# Patient Record
Sex: MALE | Race: WHITE | NOT HISPANIC OR LATINO | Employment: FULL TIME | URBAN - NONMETROPOLITAN AREA
[De-identification: names, ages, dates, MRNs, and addresses within clinical notes are randomized per-mention and may not be internally consistent; named-entity substitution may affect disease eponyms.]

---

## 2024-05-31 ENCOUNTER — OFFICE VISIT (OUTPATIENT)
Dept: URGENT CARE | Facility: CLINIC | Age: 24
End: 2024-05-31
Payer: MEDICAID

## 2024-05-31 VITALS
DIASTOLIC BLOOD PRESSURE: 87 MMHG | SYSTOLIC BLOOD PRESSURE: 125 MMHG | BODY MASS INDEX: 27.59 KG/M2 | OXYGEN SATURATION: 100 % | HEIGHT: 74 IN | HEART RATE: 71 BPM | RESPIRATION RATE: 18 BRPM | WEIGHT: 215 LBS | TEMPERATURE: 97.7 F

## 2024-05-31 DIAGNOSIS — J30.9 ALLERGIC RHINITIS, UNSPECIFIED SEASONALITY, UNSPECIFIED TRIGGER: Primary | ICD-10-CM

## 2024-05-31 PROCEDURE — 99213 OFFICE O/P EST LOW 20 MIN: CPT | Performed by: NURSE PRACTITIONER

## 2024-05-31 RX ORDER — CITALOPRAM 20 MG/1
20 TABLET, FILM COATED ORAL DAILY
COMMUNITY

## 2024-05-31 RX ORDER — PREDNISONE 10 MG/1
TABLET ORAL
Qty: 21 TABLET | Refills: 0 | Status: SHIPPED | OUTPATIENT
Start: 2024-05-31

## 2024-05-31 RX ORDER — BUPROPION HYDROCHLORIDE 100 MG/1
TABLET, EXTENDED RELEASE ORAL 2 TIMES DAILY
COMMUNITY

## 2024-05-31 RX ORDER — BENZONATATE 100 MG/1
100-200 CAPSULE ORAL EVERY 8 HOURS PRN
Qty: 60 CAPSULE | Refills: 0 | Status: SHIPPED | OUTPATIENT
Start: 2024-05-31

## 2024-05-31 NOTE — LETTER
May 31, 2024     Patient: Navarro Browning   YOB: 2000   Date of Visit: 5/31/2024       To Whom It May Concern:    Navarro Browning was seen at [facility] on 5/31/2024. Please excuse Navarro from work/school beginning now and through: 6/1/24.       Sincerely,         Sera Mcdonald, GRANT-CNP

## 2024-05-31 NOTE — PROGRESS NOTES
"[facility]  Sera Mcdonald, APRN-CNP     Visit Note - 5/31/2024 12:49 PM   This note was generated with voice recognition software and may contain errors including spelling, grammar, syntax, and misrecognization of what was dictated.    Patient: Navarro Browning, MRN: [id number], 23 y.o., male   PCP: No primary care provider on file.  ------------------------------------  ALLERGIES: No Known Allergies     CURRENT MEDICATIONS:   Current Outpatient Medications   Medication Instructions    benzonatate (TESSALON) 100-200 mg, oral, Every 8 hours PRN, Do not crush or chew.    buPROPion SR (Wellbutrin SR) 100 mg 12 hr tablet oral, 2 times daily, Do not crush, chew, or split.    citalopram (CELEXA) 20 mg, oral, Daily    predniSONE (Deltasone) 10 mg tablet Take 6 tabs PO daily x1 day, then take 5 tabs daily x1 day, then take 4 tabs daily x1 day, then take 3 tabs daily x1 day, then take 2 tabs daily x1 day, then take 1 tab daily x1 day. Take with a meal.     ------------------------------------  PAST MEDICAL HX:  History of depression and seasonal/environmental allergies.    SURGICAL HX:  History reviewed. No pertinent surgical history.   FAMILY HX:   No pertinent history.   SOCIAL HX:    has no history on file for tobacco use. Employed - works in construction.   ------------------------------------  CHIEF COMPLAINT:   Chief Complaint   Patient presents with    Cough     Cough, sinus congestion X 2 weeks       HISTORY OF PRESENT ILLNESS: The history was obtained from patient and his significant other. Navarro is a 23 y.o. male, who presents with a chief complaint of \"bad allergies\" and a cough x 2-3 weeks. Reports he has a history of bad seasonal allergies and was on allergy injections in the past, but not recently. Reports he does not feel ill. Has had nasal congestion, ears feel plugged, a persistent cough (sometimes with yellow phlegm), PND, and his throat feels a little scratchy. Had an episode of " "post-tussive vomiting last night- reports starts coughing and can't stop due to tickle in his throat. Denies any fever/chills, body aches, malaise, ear pain, abdominal pain, chest pain, wheezing/shortness of breath, rashes, urinary symptoms, and diarrhea. Denies any lightheadedness or dizziness; no changes in mental status. No swelling in legs. Appetite is  slightly decreased over the past few months ; is able to eat and drink fluids without difficulty; denies loss of sense of taste or smell. Reports symptoms are unchanged since onset. Has been taking  Claritin, Allegra, and Flonase  without much relief; has tried benadryl which helps a lot, but causes him to be too drowsy to work. No other over-the-counter medications or home remedies for symptom management. No known ill contacts.      REVIEW OF SYSTEMS:  10 systems reviewed negative with exception of history of present illness as listed above.    TODAY'S VITALS: /87   Pulse 71   Temp 36.5 °C (97.7 °F)   Resp 18   Ht 1.88 m (6' 2\")   Wt 97.5 kg (215 lb)   SpO2 100%   BMI 27.60 kg/m²     PHYSICAL EXAMINATION:  General:  Pleasant, well nourished male; alert and oriented; in no acute distress. Non-toxic appearing. Sitting comfortably on exam table. Non-dyspneic. Accompanied by his significant other.   Eyes:  Pupils equal, round and reactive to light. No conjunctival erythema; no scleral icterus.   HENT: No frontal or maxillary sinus tenderness; + audible nasal congestion. Airway patent, TMs and ear canals clear/unremarkable bilaterally. Nasal mucosa boggy and edematous. Oral mucosa moist. Posterior pharynx without erythema, vesicles, or oropharyngeal exudate aside from PND. Tonsils are normal. Uvula is midline. Managing oral secretions without difficulty.  Neck:  Supple. No palpable lymphadenopathy bilat. Trachea is midline.  Respiratory:  Respirations easy and unlabored, Breath sounds equal. Lungs are clear to auscultation; no wheezes, rhonchi, or rales; " has good air movement throughout. + non-productive cough noted. Non-dyspneic with ambulation; able to maintain SpO2.  Cardiovascular:  Normal rate, Regular rhythm. Normal S1S2. No m/r/g. No peripheral edema.   Gastrointestinal:  Soft, non-tender, non-distended; no palpable masses or organomegaly. Bowel sounds normoactive.  Musculoskeletal:  Grossly normal; appropriate for age.     Integumentary:  Pink, warm, dry, and intact. No rashes or skin discoloration appreciated. Good skin turgor.  Neurologic:  Alert and oriented, no gross deficits.    Cognition and Speech:  Oriented, Speech clear and coherent.    Psychiatric:  Cooperative, Appropriate mood & affect.       ------------------------------------  Medical Decision Making  LABORATORY or RADIOLOGICAL IMAGING ORDERS/RESULTS:   None    IMPRESSION/PLAN:  Course: Worsening; stable    1. Allergic rhinitis, unspecified seasonality, unspecified trigger  - predniSONE (Deltasone) 10 mg tablet; Take 6 tabs PO daily x1 day, then take 5 tabs daily x1 day, then take 4 tabs daily x1 day, then take 3 tabs daily x1 day, then take 2 tabs daily x1 day, then take 1 tab daily x1 day. Take with a meal.  Dispense: 21 tablet; Refill: 0  - benzonatate (Tessalon) 100 mg capsule; Take 1-2 capsules (100-200 mg) by mouth every 8 hours if needed for cough. Do not crush or chew.  Dispense: 60 capsule; Refill: 0    No red flags on exam today. Symptoms consistent with seasonal/environmental allergies, but reviewed other potential etiologies. Patient reports does not feel ill and symptoms consistent with allergies he has had in the past - states does not believe he needs antibiotics or that he has any type of infection. Due to severity and duration of symptoms, will start prednisone taper, as well as Benzonatate for PRN use. Advised to also start trial of daily Zyrtec.  Instructed to push fluids, avoid allergens as able, consider saline nasal spray/nasal lavage. Reviewed red flags to monitor for,  counseled on potential adverse reactions of treatments, expectations for improvement in sxs, and advised to follow-up with primary care provider in 3-5 days if symptoms persist, or to seek care sooner if worsening or if any additional concerns/red flags develop.  Patient agreed with plan of care; questions were encouraged and answered.       GRANT Silva-CNP   Advanced Practice Provider  [facility]